# Patient Record
Sex: FEMALE | Race: WHITE | NOT HISPANIC OR LATINO
[De-identification: names, ages, dates, MRNs, and addresses within clinical notes are randomized per-mention and may not be internally consistent; named-entity substitution may affect disease eponyms.]

---

## 2017-11-22 ENCOUNTER — RESULT REVIEW (OUTPATIENT)
Age: 52
End: 2017-11-22

## 2019-02-04 ENCOUNTER — RECORD ABSTRACTING (OUTPATIENT)
Age: 54
End: 2019-02-04

## 2019-02-04 DIAGNOSIS — D25.9 LEIOMYOMA OF UTERUS, UNSPECIFIED: ICD-10-CM

## 2019-02-04 DIAGNOSIS — B97.7 PAPILLOMAVIRUS AS THE CAUSE OF DISEASES CLASSIFIED ELSEWHERE: ICD-10-CM

## 2019-02-04 DIAGNOSIS — Z78.9 OTHER SPECIFIED HEALTH STATUS: ICD-10-CM

## 2019-02-04 DIAGNOSIS — Z80.6 FAMILY HISTORY OF LEUKEMIA: ICD-10-CM

## 2019-02-04 DIAGNOSIS — Z87.891 PERSONAL HISTORY OF NICOTINE DEPENDENCE: ICD-10-CM

## 2019-02-04 LAB — CYTOLOGY CVX/VAG DOC THIN PREP: NORMAL

## 2019-02-11 ENCOUNTER — APPOINTMENT (OUTPATIENT)
Dept: OBGYN | Facility: CLINIC | Age: 54
End: 2019-02-11
Payer: COMMERCIAL

## 2019-02-11 ENCOUNTER — RESULT CHARGE (OUTPATIENT)
Age: 54
End: 2019-02-11

## 2019-02-11 VITALS
HEIGHT: 70 IN | WEIGHT: 188 LBS | DIASTOLIC BLOOD PRESSURE: 78 MMHG | SYSTOLIC BLOOD PRESSURE: 120 MMHG | BODY MASS INDEX: 26.92 KG/M2

## 2019-02-11 LAB
BILIRUB UR QL STRIP: NORMAL
CLARITY UR: CLEAR
COLLECTION METHOD: NORMAL
GLUCOSE UR-MCNC: NORMAL
HCG UR QL: 0.2 EU/DL
HGB UR QL STRIP.AUTO: NORMAL
KETONES UR-MCNC: NORMAL
LEUKOCYTE ESTERASE UR QL STRIP: NORMAL
NITRITE UR QL STRIP: NORMAL
PH UR STRIP: 6
PROT UR STRIP-MCNC: NORMAL
SP GR UR STRIP: 1.03

## 2019-02-11 PROCEDURE — 58301 REMOVE INTRAUTERINE DEVICE: CPT

## 2019-02-11 PROCEDURE — 99396 PREV VISIT EST AGE 40-64: CPT | Mod: 25

## 2019-02-11 NOTE — PHYSICAL EXAM
[Awake] : awake [Alert] : alert [Mass] : no breast mass [Nipple Discharge] : no nipple discharge [Axillary LAD] : no axillary lymphadenopathy [Soft] : soft [Tender] : non tender [Distended] : not distended [Oriented x3] : oriented to person, place, and time [Normal] : uterus [IUD String] : had an IUD string protruding out [Motion Tenderness] : there was no cervical motion tenderness [Tenderness] : nontender [Enlarged ___ wks] : not enlarged [Mass ___ cm] : no uterine mass was palpated [Uterine Adnexae] : were not tender and not enlarged [Adnexa Tenderness] : were not tender [Ovarian Mass (___ Cm)] : there were no adnexal masses [No Tenderness] : no rectal tenderness [Nl Sphincter Tone] : normal sphincter tone [Internal Hemorrhoid] : no internal hemorrhoids [External Hemorrhoid] : no external hemorrhoids

## 2019-02-11 NOTE — HISTORY OF PRESENT ILLNESS
[___ Year(s) Ago] : [unfilled] year(s) ago [Good] : being in good health [Healthy Diet] : a healthy diet [Regular Exercise] : regular exercise [Current] : cancer screening reviewed and current [Last Pap ___] : Last cervical pap smear was [unfilled] [Last Pap Smear ___] : last Papanicolaou cytology done [unfilled] [Last HPV Screen ___] : last human papilloma virus screening done [unfilled] [Last Mammogram ___] : last mammogram done [unfilled] [Last Colonoscopy ___] : last colonoscopy done [unfilled] [Postmenopausal] : is postmenopausal [Tubal Occlusion] : has had a tubal occlusion [Pregnancy History] : pregnancy history: [Total Preg ___] : [unfilled] [Full Term ___] : [unfilled] [Living ___] : [unfilled] [Hot Flashes] : hot flashes [unknown] : the patient is unsure of the date of her LMP [Menarche Age: ____] : age at menarche was [unfilled] [Sexually Active] : is sexually active [Monogamous] : is monogamous [Contraception] : uses contraception [IUD] : has an intrauterine device [Tubal Ligation] : has had a tubal ligation [Male ___] : [unfilled] male [de-identified] : Breast US 12/15/17   BR-1

## 2019-02-12 LAB — HPV HIGH+LOW RISK DNA PNL CVX: NOT DETECTED

## 2019-02-14 LAB — CYTOLOGY CVX/VAG DOC THIN PREP: NORMAL

## 2019-03-01 ENCOUNTER — RESULT REVIEW (OUTPATIENT)
Age: 54
End: 2019-03-01

## 2020-02-25 ENCOUNTER — APPOINTMENT (OUTPATIENT)
Dept: OBGYN | Facility: CLINIC | Age: 55
End: 2020-02-25
Payer: COMMERCIAL

## 2020-02-25 VITALS
HEIGHT: 70 IN | BODY MASS INDEX: 27.49 KG/M2 | WEIGHT: 192 LBS | SYSTOLIC BLOOD PRESSURE: 110 MMHG | DIASTOLIC BLOOD PRESSURE: 70 MMHG

## 2020-02-25 DIAGNOSIS — Z87.19 PERSONAL HISTORY OF OTHER DISEASES OF THE DIGESTIVE SYSTEM: ICD-10-CM

## 2020-02-25 DIAGNOSIS — E66.8 OTHER OBESITY: ICD-10-CM

## 2020-02-25 DIAGNOSIS — Z87.42 PERSONAL HISTORY OF OTHER DISEASES OF THE FEMALE GENITAL TRACT: ICD-10-CM

## 2020-02-25 DIAGNOSIS — Z30.432 ENCOUNTER FOR REMOVAL OF INTRAUTERINE CONTRACEPTIVE DEVICE: ICD-10-CM

## 2020-02-25 PROCEDURE — 99213 OFFICE O/P EST LOW 20 MIN: CPT | Mod: 25

## 2020-02-25 PROCEDURE — 99396 PREV VISIT EST AGE 40-64: CPT

## 2020-02-25 PROCEDURE — 36415 COLL VENOUS BLD VENIPUNCTURE: CPT

## 2020-02-25 RX ORDER — IBUPROFEN 800 MG/1
800 TABLET, FILM COATED ORAL
Refills: 0 | Status: COMPLETED | COMMUNITY
End: 2020-02-25

## 2020-02-25 NOTE — HISTORY OF PRESENT ILLNESS
[Good] : being in good health [1 Year Ago] : 1 year ago [Regular Exercise] : regular exercise [Last Mammogram ___] : Last Mammogram was [unfilled] [Healthy Diet] : a healthy diet [Last Bone Density ___] : Last bone density studies [unfilled] [Last Colonoscopy ___] : Last colonoscopy [unfilled] [Postmenopausal] : is postmenopausal [Last Pap ___] : Last cervical pap smear was [unfilled] [Tubal Occlusion] : has had a tubal occlusion [Pregnancy History] : pregnancy history: [Hot Flashes] : hot flashes [Menarche Age: ____] : age at menarche was [unfilled] [Definite:  ___ (Date)] : the last menstrual period was [unfilled] [Sexually Active] : is sexually active [Tubal Ligation] : has had a tubal ligation [Contraception] : uses contraception [Monogamous] : is monogamous [No] : no [Male ___] : [unfilled] male [Localized Pain] : localized breast pain [Right Breast] : right [Burning] : no burning [Stinging] : no stinging [Mass] : no mass [Itching] : no itching [Discharge] : no discharge [Lesion] : no lesion [Soreness] : no soreness [Diffused Pain] : no diffused pain [Mass (___cm)] : no palpable mass [Nipple Discharge] : no nipple discharge [Skin Color Change] : no skin color change [Night Sweats] : no night sweats

## 2020-02-25 NOTE — PHYSICAL EXAM
[Awake] : awake [Alert] : alert [Examination Of The Breasts] : a normal appearance [No Masses] : no breast masses were palpable [Soft] : soft [Oriented x3] : oriented to person, place, and time [Labia Majora] : labia major [Normal] : clitoris [Labia Minora] : labia minora [Moderate] : there was moderate vaginal bleeding [Pap Obtained] : a Pap smear was performed [Uterine Adnexae] : were not tender and not enlarged [Acute Distress] : no acute distress [Mass] : no breast mass [Tender] : non tender [Nipple Discharge] : no nipple discharge [Axillary LAD] : no axillary lymphadenopathy [Depressed Mood] : not depressed [Distended] : not distended [Flat Affect] : affect not flat [Adnexa Tenderness] : were not tender [Tenderness] : nontender [FreeTextEntry4] : moderate amount of blood internally and externally [FreeTextEntry9] : Guiac deferred secondary to external contamination of blood from vaginal bleeding

## 2020-02-25 NOTE — REVIEW OF SYSTEMS
[Nl] : Musculoskeletal [Sleep Disturbances] : sleep disturbances [Fever] : no fever [Abn Vag Bleeding] : abnormal vaginal bleeding [Pelvic Pain] : no pelvic pain [Chills] : no chills

## 2020-02-25 NOTE — END OF VISIT
[FreeTextEntry3] : I, Savi Calhoun, acted solely as a scribe for Dr. Muñoz on this date 02/25/2020. \par All medical record entries made by the Scribe were at my, Dr. Muñoz's  direction and personally dictated by me on 02/25/2020. I have reviewed the chart and agree that the record accurately reflects my personal performance of the history, physical exam, assessment and plan. I have also personally directed, reviewed, and agreed with the chart.

## 2020-03-03 LAB
CYTOLOGY CVX/VAG DOC THIN PREP: NORMAL
FSH SERPL-MCNC: 25.7 IU/L
HPV HIGH+LOW RISK DNA PNL CVX: NOT DETECTED
LH SERPL-ACNC: 14.7 IU/L
PROLACTIN SERPL-MCNC: 4.6 NG/ML
TSH SERPL-ACNC: 1.98 UIU/ML

## 2020-03-14 LAB
BASOPHILS # BLD AUTO: 0.06 K/UL
BASOPHILS NFR BLD AUTO: 1.1 %
EOSINOPHIL # BLD AUTO: 0.14 K/UL
EOSINOPHIL NFR BLD AUTO: 2.6 %
HCT VFR BLD CALC: 49 %
HGB BLD-MCNC: 15.5 G/DL
IMM GRANULOCYTES NFR BLD AUTO: 0.2 %
LYMPHOCYTES # BLD AUTO: 1.47 K/UL
LYMPHOCYTES NFR BLD AUTO: 27.1 %
MAN DIFF?: NORMAL
MCHC RBC-ENTMCNC: 31.6 GM/DL
MCHC RBC-ENTMCNC: 31.8 PG
MCV RBC AUTO: 100.4 FL
MONOCYTES # BLD AUTO: 0.45 K/UL
MONOCYTES NFR BLD AUTO: 8.3 %
NEUTROPHILS # BLD AUTO: 3.29 K/UL
NEUTROPHILS NFR BLD AUTO: 60.7 %
PLATELET # BLD AUTO: 226 K/UL
RBC # BLD: 4.88 M/UL
RBC # FLD: 12.6 %
WBC # FLD AUTO: 5.42 K/UL

## 2020-03-17 ENCOUNTER — APPOINTMENT (OUTPATIENT)
Dept: OBGYN | Facility: CLINIC | Age: 55
End: 2020-03-17
Payer: COMMERCIAL

## 2020-03-17 ENCOUNTER — ASOB RESULT (OUTPATIENT)
Age: 55
End: 2020-03-17

## 2020-03-17 VITALS
HEIGHT: 70 IN | DIASTOLIC BLOOD PRESSURE: 68 MMHG | WEIGHT: 184 LBS | BODY MASS INDEX: 26.34 KG/M2 | SYSTOLIC BLOOD PRESSURE: 112 MMHG

## 2020-03-17 DIAGNOSIS — Z12.31 ENCOUNTER FOR SCREENING MAMMOGRAM FOR MALIGNANT NEOPLASM OF BREAST: ICD-10-CM

## 2020-03-17 PROCEDURE — 76830 TRANSVAGINAL US NON-OB: CPT

## 2020-03-17 PROCEDURE — 58558Z: CUSTOM

## 2020-03-17 NOTE — HISTORY OF PRESENT ILLNESS
[Last Mammogram ___] : Last Mammogram was [unfilled] [Last Bone Density ___] : Last bone density studies [unfilled] [Last Pap ___] : Last cervical pap smear was [unfilled] [Perimenopausal] : is perimenopausal [Definite:  ___ (Date)] : the last menstrual period was [unfilled] [Menarche Age: ____] : age at menarche was [unfilled] [Sexually Active] : is sexually active [Contraception] : uses contraception [Tubal Ligation] : has had a tubal ligation [Male ___] : [unfilled] male [NA] : N/A [de-identified] : LAMONTE 2/28/2019 - BR 2

## 2020-03-19 ENCOUNTER — TRANSCRIPTION ENCOUNTER (OUTPATIENT)
Age: 55
End: 2020-03-19

## 2020-03-24 ENCOUNTER — TRANSCRIPTION ENCOUNTER (OUTPATIENT)
Age: 55
End: 2020-03-24

## 2020-03-24 LAB — CORE LAB BIOPSY: NORMAL

## 2020-12-23 PROBLEM — Z12.31 ENCOUNTER FOR SCREENING MAMMOGRAM FOR BREAST CANCER: Status: RESOLVED | Noted: 2020-02-25 | Resolved: 2020-12-23

## 2021-02-01 ENCOUNTER — TRANSCRIPTION ENCOUNTER (OUTPATIENT)
Age: 56
End: 2021-02-01

## 2021-02-28 DIAGNOSIS — N60.19 DIFFUSE CYSTIC MASTOPATHY OF UNSPECIFIED BREAST: ICD-10-CM

## 2021-03-01 ENCOUNTER — APPOINTMENT (OUTPATIENT)
Dept: OBGYN | Facility: CLINIC | Age: 56
End: 2021-03-01
Payer: COMMERCIAL

## 2021-03-01 VITALS
DIASTOLIC BLOOD PRESSURE: 74 MMHG | SYSTOLIC BLOOD PRESSURE: 112 MMHG | WEIGHT: 184 LBS | TEMPERATURE: 97.2 F | HEIGHT: 70 IN | BODY MASS INDEX: 26.34 KG/M2

## 2021-03-01 PROCEDURE — 82270 OCCULT BLOOD FECES: CPT

## 2021-03-01 PROCEDURE — 99072 ADDL SUPL MATRL&STAF TM PHE: CPT

## 2021-03-01 PROCEDURE — 99396 PREV VISIT EST AGE 40-64: CPT

## 2021-03-01 PROCEDURE — 81003 URINALYSIS AUTO W/O SCOPE: CPT | Mod: NC,QW

## 2021-03-07 NOTE — END OF VISIT
[FreeTextEntry3] : I, Tonya Del Toro, solely acted as scribe for Dr. Hu Foss on 03/01/2021.\par All medical entries made by the Scribe were at my, Dr. Foss's direction and personally dictated by me on 03/01/2021. I have reviewed the chart and agree that the record accurately reflects my personal performance of the history, physical exam, assessment and plan. I have also personally directed, reviewed, and agreed with the chart.

## 2021-03-07 NOTE — HISTORY OF PRESENT ILLNESS
[Patient reported PAP Smear was normal] : Patient reported PAP Smear was normal [Tubal Occlusion] : has had a tubal occlusion [Y] : Patient is sexually active [Monogamous (Male Partner)] : is monogamous with a male partner [Menarche Age: ____] : age at menarche was [unfilled] [Currently Active] : currently active [Men] : men [Vaginal] : vaginal [Mammogramdate] : 2/28/2019 [TextBox_19] : BR2 [BreastSonogramDate] : 2/28/2019 [TextBox_25] : BR2 [PapSmeardate] : 2/25/2020 [HPVDate] : 2/25/2020 [TextBox_78] : NEG [LMPDate] : 2/10/2020 [PGHxTotal] : 3 [[facility]Term] : 3 [[facility]] : 3 [FreeTextEntry1] : 2/10/2020

## 2021-03-07 NOTE — DISCUSSION/SUMMARY
[FreeTextEntry1] : During this visit comprehensive counseling was given regarding the following concerns: \par \par 1 We discussed the need for proper nutrition and exercise. This includes cardiovascular and pelvic floor exercise. \par \par 2 We discussed the importance of maintaining a proper vaccination schedule and we discussed the utility of the flu vaccine and TDaP. \par \par 3 We discussed the impact of chronic sun exposure and the risk for skin disease as a result. The benefits of a total body scan by a Dermatologist was reviewed.\par \par 4 We discussed the need for certain supplements for most people which include vitamin D3, calcium rich foods with limitation on calcium supplementation by tabular form to 600 mg by mouth daily. As part of a bone health program we recommend weight bearing exercises, and some limited sun exposure (10-15 minutes daily) to help convert vitamin D to its active form. Prescription for baseline DEXA studies were given. \par \par 5 Prescription for mammogram screening and breast sonogram given. \par \par 6 We discussed the availability and utility of genetic screening. Literature on [facility] Genetic Screening was provided. We discussed the nature of this information and the possible need for further evaluation or even prophylactic treatment based on the results. I reviewed the fact that some insurance companies will cover the cost of the testing and some will not. She is aware that the company limits her liability to $199.00 in the event it is not covered and she is aware that the [employer] representative may call to request a credit card payment prior to running the test. She is also aware that she has the option of canceling the test if it is not covered. I reviewed the psychological aspects of genetic risk evaluation and the fact that the presence of a mutation does not necessarily mean she will develop a condition or disease. I also reviewed the fact that there are laws in place which protect her from discrimination based on any result. \par \par During this visit 20 minutes were spent face-to-face with greater than 50% of the time dedicated to counseling.

## 2021-03-21 LAB
CYTOLOGY CVX/VAG DOC THIN PREP: NORMAL
DATE COLLECTED: NORMAL
HEMOCCULT SP1 STL QL: NEGATIVE
HPV HIGH+LOW RISK DNA PNL CVX: NOT DETECTED
QUALITY CONTROL: YES

## 2022-03-31 ENCOUNTER — APPOINTMENT (OUTPATIENT)
Dept: OBGYN | Facility: CLINIC | Age: 57
End: 2022-03-31
Payer: COMMERCIAL

## 2022-03-31 VITALS
SYSTOLIC BLOOD PRESSURE: 122 MMHG | WEIGHT: 186 LBS | DIASTOLIC BLOOD PRESSURE: 60 MMHG | HEIGHT: 70 IN | BODY MASS INDEX: 26.63 KG/M2

## 2022-03-31 LAB
DATE COLLECTED: NORMAL
HEMOCCULT SP1 STL QL: NEGATIVE
QUALITY CONTROL: YES

## 2022-03-31 PROCEDURE — 99396 PREV VISIT EST AGE 40-64: CPT

## 2022-03-31 PROCEDURE — 82270 OCCULT BLOOD FECES: CPT

## 2022-04-02 LAB — HPV HIGH+LOW RISK DNA PNL CVX: NOT DETECTED

## 2022-04-02 NOTE — DISCUSSION/SUMMARY
[FreeTextEntry1] : During this visit comprehensive counseling was given regarding the following concerns:\par \par 1 We discussed the need for a proper nutrition and exercise. This includes cardiovascular and pelvic floor exercise. \par \par 2 We discussed the importance of maintaining a proper vaccination schedule and we discussed the utility of the flu vaccine and TDaP. \par \par 3 We discussed the impact of chronic sun exposure and the risk for skin disease as a result. The benefits of total body scan by a Dermatologist was reviewed. \par \par 4 We discussed the need for certain supplements for most people which include vitamin D3, calcium rich foods with limitation on calcium supplementation by tabular form to 600 mg by mouth daily. As part of a bone health program we recommend weight bearing exercises, and some limited sun exposure (10-15 minutes daily) to help convert vitamin D to its active form.\par \par 5 Pap smear w/HPV done today\par \par 6 Prescription for mammogram screening and breast sonogram given. \par \par 7 We discussed the benefit of pelvic floor exercises. She was instructed on the proper technique for contraction of the pelvic floor muscles and she was encouraged to perform this 20-30 times daily as part of a general exercise program. \par \par She will follow up in 1 year for her annual exam or as needed. \par \par During this visit 20 minutes were spent face-to-face with greater than 50% of the time dedicated to counseling.

## 2022-04-02 NOTE — HISTORY OF PRESENT ILLNESS
[Tubal Occlusion] : has had a tubal occlusion [Menarche Age: ____] : age at menarche was [unfilled] [Y] : Patient is sexually active [Currently Active] : currently active [Mammogramdate] : 3/26/21 [TextBox_19] : BR2 [BreastSonogramDate] : 3/26/21 [TextBox_25] : BR1 [PapSmeardate] : 3/01/21 [TextBox_31] : NEG [ColonoscopyDate] : 2016 [HPVDate] : 3/01/21 [TextBox_78] : NEG [LMPDate] : 2/10/20 [PGHxTotal] : 3 [[facility]Term] : 3 [[facility] Campusiving] : 3 [FreeTextEntry1] : 2/10/20

## 2022-04-02 NOTE — END OF VISIT
[FreeTextEntry3] : I, Dana Mchugh, solely acted as a scribe for Dr. Hu Foss on 03/31/2022 . All medical entries made by the Scribe were at my, Dr. Foss's, direction and personally dictated by me on 03/31/2022. I have reviewed the chart and agree that the record accurately reflects my personal performance of the history, physical exam, assessment and plan. I have also personally directed, reviewed and agreed with the chart.

## 2022-04-02 NOTE — PHYSICAL EXAM
[Chaperone Present] : A chaperone was present in the examining room during all aspects of the physical examination [Appropriately responsive] : appropriately responsive [Alert] : alert [No Acute Distress] : no acute distress [No Lymphadenopathy] : no lymphadenopathy [Soft] : soft [Non-tender] : non-tender [Non-distended] : non-distended [No HSM] : No HSM [No Lesions] : no lesions [No Mass] : no mass [Oriented x3] : oriented x3 [Examination Of The Breasts] : a normal appearance [No Discharge] : no discharge [No Masses] : no breast masses were palpable [Labia Majora] : normal [Labia Minora] : normal [No Bleeding] : There was no active vaginal bleeding [Normal] : normal [Uterine Adnexae] : normal [No Tenderness] : no tenderness [Nl Sphincter Tone] : normal sphincter tone [FreeTextEntry1] : MISHA Salazar [FreeTextEntry9] : Guaiac negative

## 2023-04-03 ENCOUNTER — NON-APPOINTMENT (OUTPATIENT)
Age: 58
End: 2023-04-03

## 2023-04-04 ENCOUNTER — APPOINTMENT (OUTPATIENT)
Dept: OBGYN | Facility: CLINIC | Age: 58
End: 2023-04-04
Payer: COMMERCIAL

## 2023-04-04 VITALS
DIASTOLIC BLOOD PRESSURE: 74 MMHG | SYSTOLIC BLOOD PRESSURE: 108 MMHG | WEIGHT: 182 LBS | BODY MASS INDEX: 26.05 KG/M2 | HEIGHT: 70 IN

## 2023-04-04 DIAGNOSIS — R10.2 PELVIC AND PERINEAL PAIN: ICD-10-CM

## 2023-04-04 DIAGNOSIS — Z01.419 ENCOUNTER FOR GYNECOLOGICAL EXAMINATION (GENERAL) (ROUTINE) W/OUT ABNORMAL FINDINGS: ICD-10-CM

## 2023-04-04 DIAGNOSIS — Z87.42 PERSONAL HISTORY OF OTHER DISEASES OF THE FEMALE GENITAL TRACT: ICD-10-CM

## 2023-04-04 DIAGNOSIS — Z01.411 ENCOUNTER FOR GYNECOLOGICAL EXAMINATION (GENERAL) (ROUTINE) WITH ABNORMAL FINDINGS: ICD-10-CM

## 2023-04-04 LAB
CYTOLOGY CVX/VAG DOC THIN PREP: NORMAL
DATE COLLECTED: NORMAL
HEMOCCULT SP1 STL QL: NEGATIVE
QUALITY CONTROL: YES

## 2023-04-04 PROCEDURE — 82270 OCCULT BLOOD FECES: CPT

## 2023-04-04 PROCEDURE — 99396 PREV VISIT EST AGE 40-64: CPT

## 2023-04-04 NOTE — HISTORY OF PRESENT ILLNESS
[FreeTextEntry1] : NOLA  presents for their annual gynecology exam. She mentions feeling overwhelmed after the recent passing of her son due to overdose. She is doing well physically.  [Mammogramdate] : 4/8/22 [TextBox_19] : BR1 [BreastSonogramDate] : 4/8/22 [BoneDensityDate] : 4/8/22 [ColonoscopyDate] : 2015 [HPVDate] : 3/31/22

## 2023-04-04 NOTE — PHYSICAL EXAM
[Chaperone Present] : A chaperone was present in the examining room during all aspects of the physical examination [Appropriately responsive] : appropriately responsive [Alert] : alert [No Acute Distress] : no acute distress [No Lymphadenopathy] : no lymphadenopathy [Soft] : soft [Non-tender] : non-tender [Non-distended] : non-distended [Oriented x3] : oriented x3 [Examination Of The Breasts] : a normal appearance [No Discharge] : no discharge [No Masses] : no breast masses were palpable [Labia Majora] : normal [Labia Minora] : normal [Uterine Adnexae] : normal [Normal rectal exam] : was normal [Normal Brown Stool] : was normal and brown [Normal] : was normal [None] : there was no rectal mass  [FreeTextEntry1] : Leelee Purvis, [facility] [Breast Reconstruction Right] : breast reconstruction [Breast Reconstruction Left] : breast reconstruction [Occult Blood Positive] : was negative for occult blood analysis [Internal Hemorrhoid] : no internal hemorrhoids were present [External Hemorrhoid] : no external hemorrhoids were present [Skin Tags] : no residual hemorrhoidal skin tags

## 2023-04-04 NOTE — DISCUSSION/SUMMARY
[FreeTextEntry1] : Pap smear collected.\par \par -Referred to [facility] following the passing of her son. \par \par -Prescription for mammogram and breast ultrasound given today. To be sent to [facility].  \par \par -During this visit comprehensive counseling was given regarding the following concerns:\par \par 1. We discussed the need for proper nutrition and exercise. This includes cardiovascular and pelvic floor exercises.\par \par 2. We discussed the importance of maintaining proper vaccination schedule and we discussed the utility of the HPV, Influenza, Shingles, and TDaP vaccines. \par \par 3. We discussed the impact of chronic sun exposure and the risk for skin disease as a result. The benefits of a total body scan by a Dermatologist was reviewed.\par \par 4. We discussed the need for certain supplements for most people, which include Vitamin D3 and calcium rich foods with limitation on calcium supplementation. As part of bone health program, we recommend weight bearing exercises and limited sun exposure (10-15 minutes daily) to help convert Vitamin D to its active form.\par \par All questions and concerns were addressed at today's visit.

## 2023-04-04 NOTE — END OF VISIT
[FreeTextEntry3] : I, Leelee Yaniv solely acted as a scribe for Dr. Hu Foss on 04/04/2023  All medical entries made by the scribe were at my, Dr. Foss's, direction and personally dictated by me on 04/04/2023. I have reviewed the chart and agree that the record accurately reflects my personal performance of the history, physical exam, assessment and plan. I have also personally directed, reviewed, and agreed with the chart.

## 2023-04-17 ENCOUNTER — TRANSCRIPTION ENCOUNTER (OUTPATIENT)
Age: 58
End: 2023-04-17

## 2023-04-28 ENCOUNTER — NON-APPOINTMENT (OUTPATIENT)
Age: 58
End: 2023-04-28

## 2023-05-10 ENCOUNTER — OFFICE (OUTPATIENT)
Dept: URBAN - METROPOLITAN AREA CLINIC 100 | Facility: CLINIC | Age: 58
Setting detail: OPHTHALMOLOGY
End: 2023-05-10
Payer: COMMERCIAL

## 2023-05-10 ENCOUNTER — RX ONLY (RX ONLY)
Age: 58
End: 2023-05-10

## 2023-05-10 DIAGNOSIS — H16.223: ICD-10-CM

## 2023-05-10 DIAGNOSIS — B30.9: ICD-10-CM

## 2023-05-10 PROCEDURE — 99213 OFFICE O/P EST LOW 20 MIN: CPT | Performed by: OPHTHALMOLOGY

## 2023-05-10 ASSESSMENT — SPHEQUIV_DERIVED
OS_SPHEQUIV: 0.75
OS_SPHEQUIV: 0.75
OD_SPHEQUIV: 0.875
OD_SPHEQUIV: 0.875

## 2023-05-10 ASSESSMENT — REFRACTION_CURRENTRX
OS_CYLINDER: -0.50
OD_AXIS: 075
OD_VPRISM_DIRECTION: SV
OS_OVR_VA: 20/
OS_SPHERE: +3.00
OD_OVR_VA: 20/
OS_VPRISM_DIRECTION: SV
OD_CYLINDER: -0.25
OD_SPHERE: +3.00
OS_AXIS: 119

## 2023-05-10 ASSESSMENT — AXIALLENGTH_DERIVED
OD_AL: 23.2559
OD_AL: 23.2559
OS_AL: 23.214
OS_AL: 23.214

## 2023-05-10 ASSESSMENT — CONFRONTATIONAL VISUAL FIELD TEST (CVF)
OS_FINDINGS: FULL
OD_FINDINGS: FULL

## 2023-05-10 ASSESSMENT — REFRACTION_MANIFEST
OD_VA1: 20/20
OD_SPHERE: +1.00
OS_SPHERE: +1.00
OD_AXIS: 055
OS_AXIS: 120
OD_CYLINDER: -0.25
OS_CYLINDER: -0.50
OS_VA1: 20/20

## 2023-05-10 ASSESSMENT — REFRACTION_AUTOREFRACTION
OS_SPHERE: +1.00
OD_SPHERE: +1.00
OS_AXIS: 119
OD_AXIS: 054
OS_CYLINDER: -0.50
OD_CYLINDER: -0.25

## 2023-05-10 ASSESSMENT — KERATOMETRY
OS_K2POWER_DIOPTERS: 44.00
OD_K2POWER_DIOPTERS: 43.75
OD_K1POWER_DIOPTERS: 43.25
OS_AXISANGLE_DEGREES: 070
OD_AXISANGLE_DEGREES: 092
OS_K1POWER_DIOPTERS: 43.50

## 2023-05-10 ASSESSMENT — VISUAL ACUITY
OD_BCVA: 20/30
OS_BCVA: 20/25-

## 2023-06-01 ENCOUNTER — OFFICE (OUTPATIENT)
Dept: URBAN - METROPOLITAN AREA CLINIC 100 | Facility: CLINIC | Age: 58
Setting detail: OPHTHALMOLOGY
End: 2023-06-01
Payer: COMMERCIAL

## 2023-06-01 DIAGNOSIS — H16.223: ICD-10-CM

## 2023-06-01 DIAGNOSIS — H43.393: ICD-10-CM

## 2023-06-01 PROBLEM — B30.9 VIRAL CONJUNCTIVITIS ; BOTH EYES: Status: RESOLVED | Noted: 2023-05-10 | Resolved: 2023-06-01

## 2023-06-01 PROCEDURE — 92014 COMPRE OPH EXAM EST PT 1/>: CPT | Performed by: OPHTHALMOLOGY

## 2023-06-01 ASSESSMENT — REFRACTION_AUTOREFRACTION
OD_SPHERE: +1.00
OD_AXIS: 071
OS_CYLINDER: -0.75
OD_CYLINDER: -0.50
OS_SPHERE: +1.00
OS_AXIS: 114

## 2023-06-01 ASSESSMENT — KERATOMETRY
OS_AXISANGLE_DEGREES: 160
OD_K2POWER_DIOPTERS: 43.75
OD_K1POWER_DIOPTERS: 43.25
OD_AXISANGLE_DEGREES: 180
OS_K2POWER_DIOPTERS: 44.00
OS_K1POWER_DIOPTERS: 43.25

## 2023-06-01 ASSESSMENT — VISUAL ACUITY
OS_BCVA: 20/20
OD_BCVA: 20/20

## 2023-06-01 ASSESSMENT — REFRACTION_MANIFEST
OD_AXIS: 070
OS_SPHERE: +1.00
OD_SPHERE: +1.00
OS_CYLINDER: -0.75
OU_VA: 20/20
OS_AXIS: 115
OD_CYLINDER: -0.50
OS_VA1: 20/20
OD_VA1: 20/20

## 2023-06-01 ASSESSMENT — REFRACTION_CURRENTRX
OS_CYLINDER: -0.50
OS_OVR_VA: 20/
OS_SPHERE: +3.00
OD_OVR_VA: 20/
OD_VPRISM_DIRECTION: SV
OD_CYLINDER: -0.50
OS_VPRISM_DIRECTION: SV
OD_SPHERE: +3.00
OS_AXIS: 115
OD_AXIS: 060

## 2023-06-01 ASSESSMENT — SUPERFICIAL PUNCTATE KERATITIS (SPK)
OS_SPK: T
OD_SPK: T

## 2023-06-01 ASSESSMENT — CONFRONTATIONAL VISUAL FIELD TEST (CVF)
OD_FINDINGS: FULL
OS_FINDINGS: FULL

## 2023-06-01 ASSESSMENT — SPHEQUIV_DERIVED
OD_SPHEQUIV: 0.75
OD_SPHEQUIV: 0.75
OS_SPHEQUIV: 0.625
OS_SPHEQUIV: 0.625

## 2023-06-01 ASSESSMENT — AXIALLENGTH_DERIVED
OS_AL: 23.306
OS_AL: 23.306
OD_AL: 23.3033
OD_AL: 23.3033

## 2023-06-01 ASSESSMENT — TONOMETRY
OS_IOP_MMHG: 18
OD_IOP_MMHG: 20

## 2023-08-03 ENCOUNTER — OFFICE (OUTPATIENT)
Dept: URBAN - METROPOLITAN AREA CLINIC 100 | Facility: CLINIC | Age: 58
Setting detail: OPHTHALMOLOGY
End: 2023-08-03
Payer: COMMERCIAL

## 2023-08-03 DIAGNOSIS — H02.831: ICD-10-CM

## 2023-08-03 DIAGNOSIS — H02.834: ICD-10-CM

## 2023-08-03 DIAGNOSIS — H53.40: ICD-10-CM

## 2023-08-03 DIAGNOSIS — H16.223: ICD-10-CM

## 2023-08-03 PROCEDURE — 92081 LIMITED VISUAL FIELD XM: CPT | Performed by: OPHTHALMOLOGY

## 2023-08-03 PROCEDURE — 92285 EXTERNAL OCULAR PHOTOGRAPHY: CPT | Performed by: OPHTHALMOLOGY

## 2023-08-03 PROCEDURE — 99214 OFFICE O/P EST MOD 30 MIN: CPT | Performed by: OPHTHALMOLOGY

## 2023-08-03 ASSESSMENT — KERATOMETRY
OS_K2POWER_DIOPTERS: 44.00
OS_K1POWER_DIOPTERS: 43.25
OD_K1POWER_DIOPTERS: 43.25
OD_AXISANGLE_DEGREES: 180
OD_K2POWER_DIOPTERS: 43.75
OS_AXISANGLE_DEGREES: 160

## 2023-08-03 ASSESSMENT — SUPERFICIAL PUNCTATE KERATITIS (SPK)
OS_SPK: T
OD_SPK: T

## 2023-08-03 ASSESSMENT — LID EXAM ASSESSMENTS
OS_BLEPHARITIS: 3+
OD_BLEPHARITIS: 3+
OS_BROW_PTOSIS: 4+
OD_BROW_PTOSIS: 4+

## 2023-08-03 ASSESSMENT — REFRACTION_AUTOREFRACTION
OS_AXIS: 114
OS_SPHERE: +1.00
OD_AXIS: 071
OS_CYLINDER: -0.75
OD_CYLINDER: -0.50
OD_SPHERE: +1.00

## 2023-08-03 ASSESSMENT — VISUAL ACUITY
OD_BCVA: 20/25-1
OS_BCVA: 20/25-1

## 2023-08-03 ASSESSMENT — CONFRONTATIONAL VISUAL FIELD TEST (CVF)
OD_FINDINGS: FULL
OS_FINDINGS: FULL

## 2023-08-03 ASSESSMENT — SPHEQUIV_DERIVED
OS_SPHEQUIV: 0.625
OD_SPHEQUIV: 0.75

## 2023-08-03 ASSESSMENT — AXIALLENGTH_DERIVED
OD_AL: 23.3033
OS_AL: 23.306

## 2023-08-03 ASSESSMENT — LID POSITION - DERMATOCHALASIS
OS_DERMATOCHALASIS: LUL
OD_DERMATOCHALASIS: RUL

## 2023-09-07 ENCOUNTER — OFFICE (OUTPATIENT)
Dept: URBAN - METROPOLITAN AREA CLINIC 100 | Facility: CLINIC | Age: 58
Setting detail: OPHTHALMOLOGY
End: 2023-09-07
Payer: COMMERCIAL

## 2023-09-07 DIAGNOSIS — H02.831: ICD-10-CM

## 2023-09-07 DIAGNOSIS — H53.40: ICD-10-CM

## 2023-09-07 DIAGNOSIS — H01.004: ICD-10-CM

## 2023-09-07 DIAGNOSIS — H01.001: ICD-10-CM

## 2023-09-07 DIAGNOSIS — H16.222: ICD-10-CM

## 2023-09-07 DIAGNOSIS — H16.221: ICD-10-CM

## 2023-09-07 DIAGNOSIS — H02.834: ICD-10-CM

## 2023-09-07 PROBLEM — Z41.1 ENCOUNTER FOR COSMETIC SURGERY: Status: ACTIVE | Noted: 2023-08-03

## 2023-09-07 PROCEDURE — 92012 INTRM OPH EXAM EST PATIENT: CPT | Performed by: OPHTHALMOLOGY

## 2023-09-07 PROCEDURE — 83861 MICROFLUID ANALY TEARS: CPT | Performed by: OPHTHALMOLOGY

## 2023-09-07 ASSESSMENT — REFRACTION_AUTOREFRACTION
OS_CYLINDER: -0.75
OS_AXIS: 114
OD_SPHERE: +1.00
OS_SPHERE: +1.00
OD_CYLINDER: -0.50
OD_AXIS: 071

## 2023-09-07 ASSESSMENT — SPHEQUIV_DERIVED
OS_SPHEQUIV: 0.625
OD_SPHEQUIV: 0.75

## 2023-09-07 ASSESSMENT — SUPERFICIAL PUNCTATE KERATITIS (SPK)
OS_SPK: T
OD_SPK: T

## 2023-09-07 ASSESSMENT — VISUAL ACUITY
OD_BCVA: 20/25
OS_BCVA: 20/25-1

## 2023-09-07 ASSESSMENT — AXIALLENGTH_DERIVED
OS_AL: 23.306
OD_AL: 23.3033

## 2023-09-07 ASSESSMENT — KERATOMETRY
OS_K1POWER_DIOPTERS: 43.25
OD_AXISANGLE_DEGREES: 180
OD_K1POWER_DIOPTERS: 43.25
OD_K2POWER_DIOPTERS: 43.75
OS_K2POWER_DIOPTERS: 44.00
OS_AXISANGLE_DEGREES: 160

## 2023-09-07 ASSESSMENT — CONFRONTATIONAL VISUAL FIELD TEST (CVF)
OS_FINDINGS: FULL
OD_FINDINGS: FULL

## 2023-09-07 ASSESSMENT — LID EXAM ASSESSMENTS
OS_BLEPHARITIS: 3+
OD_BROW_PTOSIS: 4+
OS_BROW_PTOSIS: 4+
OD_BLEPHARITIS: 3+

## 2023-09-07 ASSESSMENT — LID POSITION - DERMATOCHALASIS
OS_DERMATOCHALASIS: LUL
OD_DERMATOCHALASIS: RUL

## 2023-09-14 ENCOUNTER — RX ONLY (RX ONLY)
Age: 58
End: 2023-09-14

## 2023-09-14 ENCOUNTER — OFFICE (OUTPATIENT)
Dept: URBAN - METROPOLITAN AREA CLINIC 100 | Facility: CLINIC | Age: 58
Setting detail: OPHTHALMOLOGY
End: 2023-09-14
Payer: COMMERCIAL

## 2023-09-14 DIAGNOSIS — H02.831: ICD-10-CM

## 2023-09-14 DIAGNOSIS — H02.834: ICD-10-CM

## 2023-09-14 PROCEDURE — 15823 BLEPHARP UPR EYELID XCSV SKN: CPT | Performed by: OPHTHALMOLOGY

## 2023-09-14 ASSESSMENT — REFRACTION_AUTOREFRACTION
OS_CYLINDER: -0.75
OD_CYLINDER: -0.50
OD_SPHERE: +1.00
OS_SPHERE: +1.00
OS_AXIS: 114
OD_AXIS: 071

## 2023-09-14 ASSESSMENT — KERATOMETRY
OS_K2POWER_DIOPTERS: 44.00
OS_K1POWER_DIOPTERS: 43.25
OD_AXISANGLE_DEGREES: 180
OD_K2POWER_DIOPTERS: 43.75
OD_K1POWER_DIOPTERS: 43.25
OS_AXISANGLE_DEGREES: 160

## 2023-09-14 ASSESSMENT — VISUAL ACUITY
OD_BCVA: 20/25
OS_BCVA: 20/25-1

## 2023-09-14 ASSESSMENT — LID EXAM ASSESSMENTS
OD_BROW_PTOSIS: 4+
OS_BLEPHARITIS: 3+
OS_BROW_PTOSIS: 4+
OD_BLEPHARITIS: 3+

## 2023-09-14 ASSESSMENT — CONFRONTATIONAL VISUAL FIELD TEST (CVF)
OD_FINDINGS: FULL
OS_FINDINGS: FULL

## 2023-09-14 ASSESSMENT — LID POSITION - DERMATOCHALASIS
OD_DERMATOCHALASIS: RUL
OS_DERMATOCHALASIS: LUL

## 2023-09-14 ASSESSMENT — AXIALLENGTH_DERIVED
OS_AL: 23.306
OD_AL: 23.3033

## 2023-09-14 ASSESSMENT — SPHEQUIV_DERIVED
OS_SPHEQUIV: 0.625
OD_SPHEQUIV: 0.75

## 2023-09-21 ENCOUNTER — OFFICE (OUTPATIENT)
Dept: URBAN - METROPOLITAN AREA CLINIC 100 | Facility: CLINIC | Age: 58
Setting detail: OPHTHALMOLOGY
End: 2023-09-21
Payer: COMMERCIAL

## 2023-09-21 DIAGNOSIS — H02.834: ICD-10-CM

## 2023-09-21 DIAGNOSIS — H02.831: ICD-10-CM

## 2023-09-21 PROBLEM — H16.223 DRY EYE SYNDROME K SICCA; BOTH EYES: Status: ACTIVE | Noted: 2023-09-07

## 2023-09-21 PROBLEM — H01.002 BLEPHARITIS; RIGHT LOWER LID, LEFT LOWER LID: Status: ACTIVE | Noted: 2023-09-21

## 2023-09-21 PROBLEM — H01.005 BLEPHARITIS; RIGHT LOWER LID, LEFT LOWER LID: Status: ACTIVE | Noted: 2023-09-21

## 2023-09-21 PROCEDURE — 99024 POSTOP FOLLOW-UP VISIT: CPT | Performed by: OPHTHALMOLOGY

## 2023-09-21 ASSESSMENT — LID POSITION - COMMENTS
OD_COMMENTS: INCISION INTACT
OS_COMMENTS: INCISION INTACT

## 2023-09-21 ASSESSMENT — KERATOMETRY
OS_AXISANGLE_DEGREES: 160
OD_AXISANGLE_DEGREES: 180
OS_K1POWER_DIOPTERS: 43.25
OD_K1POWER_DIOPTERS: 43.25
OD_K2POWER_DIOPTERS: 43.75
OS_K2POWER_DIOPTERS: 44.00

## 2023-09-21 ASSESSMENT — CONFRONTATIONAL VISUAL FIELD TEST (CVF)
OS_FINDINGS: FULL
OD_FINDINGS: FULL

## 2023-09-21 ASSESSMENT — VISUAL ACUITY
OD_BCVA: 20/25
OS_BCVA: 20/25-1

## 2023-09-21 ASSESSMENT — REFRACTION_AUTOREFRACTION
OS_AXIS: 114
OD_SPHERE: +1.00
OD_CYLINDER: -0.50
OD_AXIS: 071
OS_CYLINDER: -0.75
OS_SPHERE: +1.00

## 2023-09-21 ASSESSMENT — SUPERFICIAL PUNCTATE KERATITIS (SPK)
OD_SPK: T
OS_SPK: T

## 2023-09-21 ASSESSMENT — AXIALLENGTH_DERIVED
OD_AL: 23.3033
OS_AL: 23.306

## 2023-09-21 ASSESSMENT — SPHEQUIV_DERIVED
OS_SPHEQUIV: 0.625
OD_SPHEQUIV: 0.75

## 2023-09-21 ASSESSMENT — LID EXAM ASSESSMENTS
OS_BROW_PTOSIS: 4+
OS_BLEPHARITIS: 3+
OD_BLEPHARITIS: 3+
OD_BROW_PTOSIS: 4+

## 2023-09-21 ASSESSMENT — LID POSITION - DERMATOCHALASIS
OD_DERMATOCHALASIS: ABSENT
OS_DERMATOCHALASIS: ABSENT

## 2023-10-23 ENCOUNTER — OFFICE (OUTPATIENT)
Dept: URBAN - METROPOLITAN AREA CLINIC 100 | Facility: CLINIC | Age: 58
Setting detail: OPHTHALMOLOGY
End: 2023-10-23
Payer: COMMERCIAL

## 2023-10-23 DIAGNOSIS — H02.831: ICD-10-CM

## 2023-10-23 DIAGNOSIS — H02.834: ICD-10-CM

## 2023-10-23 PROBLEM — H01.005 BLEPHARITIS; RIGHT LOWER LID, LEFT LOWER LID: Status: ACTIVE | Noted: 2023-10-23

## 2023-10-23 PROBLEM — H01.002 BLEPHARITIS; RIGHT LOWER LID, LEFT LOWER LID: Status: ACTIVE | Noted: 2023-10-23

## 2023-10-23 PROCEDURE — 99024 POSTOP FOLLOW-UP VISIT: CPT | Performed by: OPHTHALMOLOGY

## 2023-10-23 ASSESSMENT — LID POSITION - COMMENTS
OD_COMMENTS: INCISION INTACT
OS_COMMENTS: INCISION INTACT

## 2023-10-23 ASSESSMENT — REFRACTION_AUTOREFRACTION
OS_SPHERE: +1.00
OS_CYLINDER: -0.75
OS_AXIS: 114
OD_AXIS: 071
OD_CYLINDER: -0.50
OD_SPHERE: +1.00

## 2023-10-23 ASSESSMENT — KERATOMETRY
OS_K1POWER_DIOPTERS: 43.25
OS_K2POWER_DIOPTERS: 44.00
OS_AXISANGLE_DEGREES: 160
OD_K2POWER_DIOPTERS: 43.75
OD_AXISANGLE_DEGREES: 180
OD_K1POWER_DIOPTERS: 43.25

## 2023-10-23 ASSESSMENT — VISUAL ACUITY
OD_BCVA: 20/20-
OS_BCVA: 20/25

## 2023-10-23 ASSESSMENT — LID EXAM ASSESSMENTS
OD_BROW_PTOSIS: 4+
OS_BROW_PTOSIS: 4+
OS_BLEPHARITIS: 3+
OD_BLEPHARITIS: 3+

## 2023-10-23 ASSESSMENT — SUPERFICIAL PUNCTATE KERATITIS (SPK)
OS_SPK: T
OD_SPK: T

## 2023-10-23 ASSESSMENT — AXIALLENGTH_DERIVED
OD_AL: 23.3033
OS_AL: 23.306

## 2023-10-23 ASSESSMENT — SPHEQUIV_DERIVED
OD_SPHEQUIV: 0.75
OS_SPHEQUIV: 0.625

## 2023-10-23 ASSESSMENT — CONFRONTATIONAL VISUAL FIELD TEST (CVF)
OS_FINDINGS: FULL
OD_FINDINGS: FULL

## 2024-02-04 LAB
CYTOLOGY CVX/VAG DOC THIN PREP: NORMAL
HPV HIGH+LOW RISK DNA PNL CVX: DETECTED

## 2024-03-11 ENCOUNTER — APPOINTMENT (OUTPATIENT)
Dept: ORTHOPEDIC SURGERY | Facility: CLINIC | Age: 59
End: 2024-03-11
Payer: COMMERCIAL

## 2024-03-11 DIAGNOSIS — M75.02 ADHESIVE CAPSULITIS OF LEFT SHOULDER: ICD-10-CM

## 2024-03-11 PROCEDURE — 99214 OFFICE O/P EST MOD 30 MIN: CPT | Mod: 25

## 2024-03-11 PROCEDURE — 20610 DRAIN/INJ JOINT/BURSA W/O US: CPT | Mod: LT

## 2024-03-11 PROCEDURE — 73030 X-RAY EXAM OF SHOULDER: CPT | Mod: LT

## 2024-03-11 NOTE — DISCUSSION/SUMMARY
[de-identified] : History, clinical examination and imaging were most consistent with: -left shoulder adhesive capsulitis   The diagnosis was explained in detail. The potential non-surgical and surgical treatments were reviewed. The relative risks and benefits of each option were considered relative to the patients age, activity level, medical history, symptom severity and previously attempted treatments.   The patient was advised to consult with their primary medical provider prior to initiation of any new medications to reduce the risk of adverse effects specific to their long-term home medications and medical history. The risk of gastrointestinal irritation and kidney injury specific to long-term NSAID use was discussed.   -Patient will proceed with formal PT. -Cortisone injection performed today for symptom relief. -Meloxicam as needed for pain. -The added clinical utility of an MRI was discussed. The patient deferred further diagnostic testing at this time. -Follow up in 2 months, if symptoms persist consider repeat CSI vs MRI.   (MDM)   Problem Complexity -Moderate: chronic illness with exacerbation   Risk -Moderate: injection   -Patient has not been seen by another provider in my practice within the past 2 years who specializes in orthopedic surgery.   (Procedure: Corticosteroid Injection)   Injection location was the: -left subacromial bursa   Injection contents were: 40 mg of kenalog and 5 mL of 1% lidocaine   Procedure Details: -Informed consent was obtained. Discussed possible risks of corticosteroid injection including hyperglycemia, infection and skin discoloration. -Discussed that due to infection risk, an interval between injection and any future surgery is 6 weeks for arthroscopy and 3 months for arthroplasty. -Injection performed using aseptic technique. Hemostasis was confirmed. -Procedure was tolerated well with no complications.

## 2024-03-11 NOTE — HISTORY OF PRESENT ILLNESS
[de-identified] : Date of initial evaluation is 03/11/2024 Patient age is 58 year  Occupation is book keeper Body part causing symptoms is the left shoulder  Symptoms began  3 months ago, she pulled her shoulder exercising  Reports progressive pain and stiffness  Location of pain is lateral  Quality of pain is sharp Pain score at rest is 1/10 Pain score during activity is 10/10 Radicular symptoms are not present Prior treatments include none Patient's condition is not associated with workers compensation, no-fault or interscholastic athletics

## 2024-03-26 ENCOUNTER — APPOINTMENT (OUTPATIENT)
Dept: OBGYN | Facility: CLINIC | Age: 59
End: 2024-03-26
Payer: COMMERCIAL

## 2024-03-26 VITALS
WEIGHT: 162 LBS | DIASTOLIC BLOOD PRESSURE: 73 MMHG | HEIGHT: 70 IN | BODY MASS INDEX: 23.19 KG/M2 | SYSTOLIC BLOOD PRESSURE: 110 MMHG

## 2024-03-26 DIAGNOSIS — Z12.39 ENCOUNTER FOR OTHER SCREENING FOR MALIGNANT NEOPLASM OF BREAST: ICD-10-CM

## 2024-03-26 DIAGNOSIS — R92.30 DENSE BREASTS, UNSPECIFIED: ICD-10-CM

## 2024-03-26 DIAGNOSIS — Z78.0 ASYMPTOMATIC MENOPAUSAL STATE: ICD-10-CM

## 2024-03-26 DIAGNOSIS — Z12.12 ENCOUNTER FOR SCREENING FOR MALIGNANT NEOPLASM OF RECTUM: ICD-10-CM

## 2024-03-26 DIAGNOSIS — Z12.4 ENCOUNTER FOR SCREENING FOR MALIGNANT NEOPLASM OF CERVIX: ICD-10-CM

## 2024-03-26 LAB
DATE COLLECTED: NORMAL
HEMOCCULT SP1 STL QL: NEGATIVE
QUALITY CONTROL: YES

## 2024-03-26 PROCEDURE — 82270 OCCULT BLOOD FECES: CPT

## 2024-03-26 PROCEDURE — 99396 PREV VISIT EST AGE 40-64: CPT

## 2024-03-26 RX ORDER — IBUPROFEN 800 MG/1
800 TABLET, FILM COATED ORAL
Qty: 60 | Refills: 0 | Status: DISCONTINUED | COMMUNITY
Start: 2021-03-01 | End: 2024-03-26

## 2024-03-26 RX ORDER — MELOXICAM 15 MG/1
15 TABLET ORAL DAILY
Qty: 30 | Refills: 2 | Status: DISCONTINUED | COMMUNITY
Start: 2024-03-11 | End: 2024-03-26

## 2024-03-26 RX ORDER — TIRZEPATIDE 7.5 MG/.5ML
INJECTION, SOLUTION SUBCUTANEOUS
Refills: 0 | Status: ACTIVE | COMMUNITY

## 2024-03-26 RX ORDER — IBUPROFEN 600 MG/1
600 TABLET, FILM COATED ORAL 3 TIMES DAILY
Qty: 30 | Refills: 0 | Status: DISCONTINUED | COMMUNITY
Start: 2020-03-17 | End: 2024-03-26

## 2024-03-26 NOTE — PHYSICAL EXAM
[Chaperone Present] : A chaperone was present in the examining room during all aspects of the physical examination [Appropriately responsive] : appropriately responsive [Alert] : alert [No Acute Distress] : no acute distress [No Lymphadenopathy] : no lymphadenopathy [Soft] : soft [Non-tender] : non-tender [Non-distended] : non-distended [No HSM] : No HSM [No Lesions] : no lesions [No Mass] : no mass [Oriented x3] : oriented x3 [Examination Of The Breasts] : a normal appearance [No Masses] : no breast masses were palpable [Labia Majora] : normal [Labia Minora] : normal [Normal] : normal [Uterine Adnexae] : normal [Normal rectal exam] : was normal [Normal Brown Stool] : was normal and brown [FreeTextEntry1] : HI Thapa and Cristi Marie [Occult Blood Positive] : was negative for occult blood analysis

## 2024-03-26 NOTE — DISCUSSION/SUMMARY
[FreeTextEntry1] : -Annual gynecology exam- 1) Pap smear collected. 2) Benign rectal exam. Hemoccult negative. The importance of a baseline colonoscopy screening was discussed. She was strongly encouraged to follow up with GI or colorectal surgeon for baseline colonoscopy screening. 3) Prescription for mammogram screening and breast ultrasound ordered.   -During this visit comprehensive counseling was given regarding the following concerns: 1. We discussed the need for proper nutrition and exercise. This includes cardiovascular and pelvic floor exercises. 2. We discussed the importance of maintaining proper vaccination schedule and we discussed the utility of the HPV, Influenza, Shingles, and TDaP vaccines. 3. We discussed the impact of chronic sun exposure and the risk for skin disease as a result. The benefits of a total body scan by a Dermatologist was reviewed. 4. We discussed the need for certain supplements for most people, which include Vitamin D3 (2000 IU) and calcium rich foods with limitation on calcium supplementation by tabular form to 600 MG by mouth daily. As part of bone health program, we recommend weight bearing exercises and limited sun exposure (10-15 minutes daily) to help convert Vitamin D to its active form. 5. We discussed the benefit of pelvic floor exercises. She was instructed on the proper technique for contraction of the pelvic floor muscles and she was encouraged to perform this 20- 30 times daily as part of a general exercise program.  -She will follow up in 1 year or as needed. All questions and concerns were addressed.

## 2024-03-26 NOTE — END OF VISIT
[FreeTextEntry3] : I, Dana Zaldivar solely acted as a scribe for Dr. Hu Foss on 03/26/2024 . All medical entries made by the scribe were at my, Dr. Foss's, direction and personally dictated by me on 03/26/2024 . I have reviewed the chart and agree that the record accurately reflects my personal performance of the history, physical exam, assessment and plan. I have also personally directed, reviewed, and agreed with the chart.

## 2024-03-26 NOTE — HISTORY OF PRESENT ILLNESS
[Tubal Occlusion] : has had a tubal occlusion [Menarche Age: ____] : age at menarche was [unfilled] [Y] : Patient is sexually active [Currently Active] : currently active [Mammogramdate] : 5/18/23 [TextBox_19] : BR2 [PapSmeardate] : 4/4/23 [BoneDensityDate] : 4/8/22 [TextBox_31] : NEG [TextBox_37] : NORMAL [ColonoscopyDate] : 2015 [HPVDate] : 4/4/23 [LMPDate] : 2020 [TextBox_78] : POSITIVE [PGHxTotal] : 3 [[facility]] : 3 [[facility]] : 3 [FreeTextEntry1] : 2020

## 2024-03-28 LAB — HPV HIGH+LOW RISK DNA PNL CVX: NOT DETECTED

## 2024-03-31 LAB — CYTOLOGY CVX/VAG DOC THIN PREP: ABNORMAL

## 2024-05-13 ENCOUNTER — APPOINTMENT (OUTPATIENT)
Dept: ORTHOPEDIC SURGERY | Facility: CLINIC | Age: 59
End: 2024-05-13

## 2024-07-25 ENCOUNTER — OFFICE (OUTPATIENT)
Dept: URBAN - METROPOLITAN AREA CLINIC 12 | Facility: CLINIC | Age: 59
Setting detail: OPHTHALMOLOGY
End: 2024-07-25
Payer: COMMERCIAL

## 2024-07-25 DIAGNOSIS — H43.393: ICD-10-CM

## 2024-07-25 DIAGNOSIS — H25.13: ICD-10-CM

## 2024-07-25 DIAGNOSIS — H43.811: ICD-10-CM

## 2024-07-25 DIAGNOSIS — H16.223: ICD-10-CM

## 2024-07-25 PROCEDURE — 92014 COMPRE OPH EXAM EST PT 1/>: CPT | Performed by: OPHTHALMOLOGY

## 2024-07-25 ASSESSMENT — CONFRONTATIONAL VISUAL FIELD TEST (CVF)
OD_FINDINGS: FULL
OS_FINDINGS: FULL

## 2024-07-25 ASSESSMENT — LID POSITION - COMMENTS
OS_COMMENTS: INCISION INTACT
OD_COMMENTS: INCISION INTACT

## 2024-08-15 ENCOUNTER — OFFICE (OUTPATIENT)
Dept: URBAN - METROPOLITAN AREA CLINIC 12 | Facility: CLINIC | Age: 59
Setting detail: OPHTHALMOLOGY
End: 2024-08-15
Payer: COMMERCIAL

## 2024-08-15 ENCOUNTER — RX ONLY (RX ONLY)
Age: 59
End: 2024-08-15

## 2024-08-15 DIAGNOSIS — H43.393: ICD-10-CM

## 2024-08-15 DIAGNOSIS — H43.811: ICD-10-CM

## 2024-08-15 DIAGNOSIS — H25.13: ICD-10-CM

## 2024-08-15 DIAGNOSIS — H16.223: ICD-10-CM

## 2024-08-15 PROCEDURE — 92012 INTRM OPH EXAM EST PATIENT: CPT | Performed by: OPHTHALMOLOGY

## 2024-08-15 ASSESSMENT — LID POSITION - COMMENTS
OS_COMMENTS: INCISION INTACT
OD_COMMENTS: INCISION INTACT

## 2024-08-15 ASSESSMENT — CONFRONTATIONAL VISUAL FIELD TEST (CVF)
OD_FINDINGS: FULL
OS_FINDINGS: FULL

## 2024-08-26 ENCOUNTER — OFFICE (OUTPATIENT)
Dept: URBAN - METROPOLITAN AREA CLINIC 88 | Facility: CLINIC | Age: 59
Setting detail: OPHTHALMOLOGY
End: 2024-08-26
Payer: COMMERCIAL

## 2024-08-26 DIAGNOSIS — H43.811: ICD-10-CM

## 2024-08-26 DIAGNOSIS — H43.393: ICD-10-CM

## 2024-08-26 PROCEDURE — 92014 COMPRE OPH EXAM EST PT 1/>: CPT | Performed by: OPHTHALMOLOGY

## 2024-08-26 PROCEDURE — 92134 CPTRZ OPH DX IMG PST SGM RTA: CPT | Performed by: OPHTHALMOLOGY

## 2024-08-26 PROCEDURE — 92201 OPSCPY EXTND RTA DRAW UNI/BI: CPT | Performed by: OPHTHALMOLOGY

## 2024-08-26 ASSESSMENT — LID POSITION - COMMENTS
OS_COMMENTS: INCISION INTACT
OD_COMMENTS: INCISION INTACT

## 2024-08-26 ASSESSMENT — CONFRONTATIONAL VISUAL FIELD TEST (CVF)
OS_FINDINGS: FULL
OD_FINDINGS: FULL

## 2025-02-18 ENCOUNTER — OFFICE (OUTPATIENT)
Dept: URBAN - METROPOLITAN AREA CLINIC 103 | Facility: CLINIC | Age: 60
Setting detail: OPHTHALMOLOGY
End: 2025-02-18
Payer: COMMERCIAL

## 2025-02-18 DIAGNOSIS — H52.4: ICD-10-CM

## 2025-02-18 DIAGNOSIS — H01.001: ICD-10-CM

## 2025-02-18 DIAGNOSIS — H00.14: ICD-10-CM

## 2025-02-18 PROBLEM — H25.13 CATARACT SENILE NUCLEAR SCLEROSIS; BOTH EYES: Status: ACTIVE | Noted: 2025-02-18

## 2025-02-18 PROBLEM — H01.004 BLEPHARITIS; RIGHT UPPER LID, LEFT UPPER LID, RIGHT LOWER LID, LEFT LOWER LID: Status: ACTIVE | Noted: 2025-02-18

## 2025-02-18 PROBLEM — H01.005 BLEPHARITIS; RIGHT UPPER LID, LEFT UPPER LID, RIGHT LOWER LID, LEFT LOWER LID: Status: ACTIVE | Noted: 2025-02-18

## 2025-02-18 PROBLEM — H01.002 BLEPHARITIS; RIGHT UPPER LID, LEFT UPPER LID, RIGHT LOWER LID, LEFT LOWER LID: Status: ACTIVE | Noted: 2025-02-18

## 2025-02-18 PROBLEM — B88.0 ACARIASIS, INFESTATION OF MITES AND OR TICS: Status: ACTIVE | Noted: 2025-02-18

## 2025-02-18 PROCEDURE — 92012 INTRM OPH EXAM EST PATIENT: CPT | Performed by: OPTOMETRIST

## 2025-02-18 PROCEDURE — 92015 DETERMINE REFRACTIVE STATE: CPT | Performed by: OPTOMETRIST

## 2025-02-18 ASSESSMENT — REFRACTION_MANIFEST
OS_ADD: +2.25
OD_SPHERE: +1.50
OS_VA1: 20/20
OS_AXIS: 000
OD_CYLINDER: 0.00
OD_AXIS: 000
OD_VA1: 20/20
OS_CYLINDER: 0.00
OD_ADD: +2.25
OS_SPHERE: +1.50

## 2025-02-18 ASSESSMENT — REFRACTION_AUTOREFRACTION
OS_SPHERE: +1.75
OD_AXIS: 061
OD_SPHERE: +1.50
OS_CYLINDER: -0.25
OD_CYLINDER: -0.50
OS_AXIS: 160

## 2025-02-18 ASSESSMENT — LID EXAM ASSESSMENTS
OS_BLEPHARITIS: LLL LUL 4+
OD_BLEPHARITIS: RLL RUL 4+
OS_BROW_PTOSIS: 4+
OD_BROW_PTOSIS: 4+

## 2025-02-18 ASSESSMENT — VISUAL ACUITY
OD_BCVA: 20/50
OS_BCVA: 20/70

## 2025-02-18 ASSESSMENT — CONFRONTATIONAL VISUAL FIELD TEST (CVF)
OD_FINDINGS: FULL
OS_FINDINGS: FULL

## 2025-02-18 ASSESSMENT — SUPERFICIAL PUNCTATE KERATITIS (SPK)
OD_SPK: T
OS_SPK: T

## 2025-02-18 ASSESSMENT — LID POSITION - COMMENTS
OD_COMMENTS: INCISION INTACT
OS_COMMENTS: INCISION INTACT

## 2025-02-18 ASSESSMENT — KERATOMETRY
OD_K2POWER_DIOPTERS: 43.75
OS_K1POWER_DIOPTERS: 42.75
OD_K1POWER_DIOPTERS: 43.25
OS_AXISANGLE_DEGREES: 078
OS_K2POWER_DIOPTERS: 43.50
OD_AXISANGLE_DEGREES: 095

## 2025-02-18 ASSESSMENT — TONOMETRY
OD_IOP_MMHG: 17
OS_IOP_MMHG: 16

## 2025-03-11 ENCOUNTER — OFFICE (OUTPATIENT)
Dept: URBAN - METROPOLITAN AREA CLINIC 103 | Facility: CLINIC | Age: 60
Setting detail: OPHTHALMOLOGY
End: 2025-03-11
Payer: COMMERCIAL

## 2025-03-11 DIAGNOSIS — H01.001: ICD-10-CM

## 2025-03-11 DIAGNOSIS — H01.002: ICD-10-CM

## 2025-03-11 DIAGNOSIS — H01.004: ICD-10-CM

## 2025-03-11 DIAGNOSIS — H01.005: ICD-10-CM

## 2025-03-11 PROCEDURE — 92012 INTRM OPH EXAM EST PATIENT: CPT | Performed by: OPTOMETRIST

## 2025-03-11 ASSESSMENT — REFRACTION_AUTOREFRACTION
OD_AXIS: 055
OD_CYLINDER: -0.50
OS_SPHERE: +1.00
OS_CYLINDER: SPH
OD_SPHERE: +1.25

## 2025-03-11 ASSESSMENT — KERATOMETRY
OD_AXISANGLE_DEGREES: 102
OS_AXISANGLE_DEGREES: 077
OS_K2POWER_DIOPTERS: 43.75
OD_K2POWER_DIOPTERS: 43.75
OD_K1POWER_DIOPTERS: 43.25
OS_K1POWER_DIOPTERS: 43.00

## 2025-03-11 ASSESSMENT — SUPERFICIAL PUNCTATE KERATITIS (SPK)
OD_SPK: T
OS_SPK: T

## 2025-03-11 ASSESSMENT — REFRACTION_MANIFEST
OD_VA1: 20/20
OS_VA1: 20/20
OS_SPHERE: +1.50
OD_SPHERE: +1.50
OS_ADD: +2.25
OS_AXIS: 000
OD_ADD: +2.25
OD_AXIS: 000
OS_CYLINDER: 0.00
OD_CYLINDER: 0.00

## 2025-03-11 ASSESSMENT — LID EXAM ASSESSMENTS
OS_BROW_PTOSIS: 4+
OD_BROW_PTOSIS: 4+
OD_BLEPHARITIS: RLL RUL 4+
OS_BLEPHARITIS: LLL LUL 4+

## 2025-03-11 ASSESSMENT — LID POSITION - COMMENTS
OD_COMMENTS: INCISION INTACT
OS_COMMENTS: INCISION INTACT

## 2025-03-11 ASSESSMENT — TONOMETRY
OD_IOP_MMHG: 17
OS_IOP_MMHG: 19

## 2025-03-11 ASSESSMENT — VISUAL ACUITY
OS_BCVA: 20/60-2
OD_BCVA: 20/60-1

## 2025-03-27 ENCOUNTER — NON-APPOINTMENT (OUTPATIENT)
Age: 60
End: 2025-03-27

## 2025-03-27 ENCOUNTER — APPOINTMENT (OUTPATIENT)
Dept: OBGYN | Facility: CLINIC | Age: 60
End: 2025-03-27
Payer: COMMERCIAL

## 2025-03-27 DIAGNOSIS — N95.2 POSTMENOPAUSAL ATROPHIC VAGINITIS: ICD-10-CM

## 2025-03-27 DIAGNOSIS — N39.0 URINARY TRACT INFECTION, SITE NOT SPECIFIED: ICD-10-CM

## 2025-03-27 LAB
APPEARANCE: CLEAR
BILIRUBIN URINE: NEGATIVE
BLOOD URINE: NEGATIVE
COLOR: YELLOW
GLUCOSE QUALITATIVE U: NEGATIVE
KETONES URINE: NEGATIVE
LEUKOCYTE ESTERASE URINE: ABNORMAL
NITRITE URINE: NEGATIVE
PH URINE: 7.5
PROTEIN URINE: NEGATIVE
SPECIFIC GRAVITY URINE: 1.02
UROBILINOGEN URINE: 0.2 (ref 0.2–?)

## 2025-03-27 PROCEDURE — 99459 PELVIC EXAMINATION: CPT

## 2025-03-27 PROCEDURE — 99396 PREV VISIT EST AGE 40-64: CPT

## 2025-03-27 PROCEDURE — 82270 OCCULT BLOOD FECES: CPT

## 2025-03-27 RX ORDER — ESTRADIOL 0.1 MG/G
0.1 CREAM VAGINAL
Qty: 2 | Refills: 2 | Status: ACTIVE | COMMUNITY
Start: 2025-03-27 | End: 1900-01-01

## 2025-03-30 LAB
DATE COLLECTED: NORMAL
HEMOCCULT SP1 STL QL: NEGATIVE
HPV HIGH+LOW RISK DNA PNL CVX: NOT DETECTED
QUALITY CONTROL: YES

## 2025-04-01 LAB — CYTOLOGY CVX/VAG DOC THIN PREP: ABNORMAL

## 2025-04-22 ENCOUNTER — OFFICE (OUTPATIENT)
Dept: URBAN - METROPOLITAN AREA CLINIC 103 | Facility: CLINIC | Age: 60
Setting detail: OPHTHALMOLOGY
End: 2025-04-22
Payer: COMMERCIAL

## 2025-04-22 DIAGNOSIS — H04.123: ICD-10-CM

## 2025-04-22 DIAGNOSIS — H01.005: ICD-10-CM

## 2025-04-22 DIAGNOSIS — H01.001: ICD-10-CM

## 2025-04-22 DIAGNOSIS — H01.002: ICD-10-CM

## 2025-04-22 DIAGNOSIS — H01.004: ICD-10-CM

## 2025-04-22 PROCEDURE — 92012 INTRM OPH EXAM EST PATIENT: CPT | Performed by: OPTOMETRIST

## 2025-04-22 ASSESSMENT — REFRACTION_MANIFEST
OS_AXIS: 000
OD_CYLINDER: 0.00
OD_VA1: 20/20
OS_ADD: +2.25
OS_SPHERE: +1.50
OS_VA1: 20/20
OD_AXIS: 000
OD_ADD: +2.25
OS_CYLINDER: 0.00
OD_SPHERE: +1.50

## 2025-04-22 ASSESSMENT — KERATOMETRY
OD_AXISANGLE_DEGREES: 093
OS_K1POWER_DIOPTERS: 43.50
OS_K2POWER_DIOPTERS: 44.00
OS_AXISANGLE_DEGREES: 074
OD_K1POWER_DIOPTERS: 43.25
OD_K2POWER_DIOPTERS: 43.75

## 2025-04-22 ASSESSMENT — CONFRONTATIONAL VISUAL FIELD TEST (CVF)
OS_FINDINGS: FULL
OD_FINDINGS: FULL

## 2025-04-22 ASSESSMENT — SUPERFICIAL PUNCTATE KERATITIS (SPK)
OS_SPK: 2+ 3+
OD_SPK: 2+ 3+

## 2025-04-22 ASSESSMENT — REFRACTION_AUTOREFRACTION
OD_CYLINDER: -0.50
OS_SPHERE: +1.00
OD_SPHERE: +1.50
OS_CYLINDER: SPH
OD_AXIS: 065

## 2025-04-22 ASSESSMENT — LID EXAM ASSESSMENTS
OS_BROW_PTOSIS: 4+
OD_BROW_PTOSIS: 4+
OD_BLEPHARITIS: RLL RUL 4+
OS_BLEPHARITIS: LLL LUL 4+

## 2025-04-22 ASSESSMENT — LID POSITION - COMMENTS
OD_COMMENTS: INCISION INTACT
OS_COMMENTS: INCISION INTACT

## 2025-04-22 ASSESSMENT — TONOMETRY
OD_IOP_MMHG: 15
OS_IOP_MMHG: 16

## 2025-04-22 ASSESSMENT — VISUAL ACUITY
OD_BCVA: 20/60-1
OS_BCVA: 20/70

## 2025-04-30 ENCOUNTER — APPOINTMENT (OUTPATIENT)
Dept: OBGYN | Facility: CLINIC | Age: 60
End: 2025-04-30
Payer: COMMERCIAL

## 2025-04-30 VITALS
HEIGHT: 70 IN | DIASTOLIC BLOOD PRESSURE: 83 MMHG | BODY MASS INDEX: 22.19 KG/M2 | WEIGHT: 155 LBS | SYSTOLIC BLOOD PRESSURE: 118 MMHG

## 2025-04-30 DIAGNOSIS — R39.9 UNSPECIFIED SYMPTOMS AND SIGNS INVOLVING THE GENITOURINARY SYSTEM: ICD-10-CM

## 2025-04-30 LAB
APPEARANCE: CLEAR
BILIRUBIN URINE: NEGATIVE
BLOOD URINE: NEGATIVE
COLOR: YELLOW
GLUCOSE QUALITATIVE U: NEGATIVE
KETONES URINE: NEGATIVE
LEUKOCYTE ESTERASE URINE: NEGATIVE
NITRITE URINE: NEGATIVE
PH URINE: 7.5
PROTEIN URINE: NEGATIVE
SPECIFIC GRAVITY URINE: 1.01
UROBILINOGEN URINE: 0.2 (ref 0.2–?)

## 2025-04-30 PROCEDURE — 99213 OFFICE O/P EST LOW 20 MIN: CPT

## 2025-05-02 ENCOUNTER — NON-APPOINTMENT (OUTPATIENT)
Age: 60
End: 2025-05-02

## 2025-05-02 LAB — BACTERIA UR CULT: NORMAL

## 2025-08-12 ENCOUNTER — APPOINTMENT (OUTPATIENT)
Facility: CLINIC | Age: 60
End: 2025-08-12

## 2025-08-28 ENCOUNTER — OFFICE (OUTPATIENT)
Dept: URBAN - METROPOLITAN AREA CLINIC 103 | Facility: CLINIC | Age: 60
Setting detail: OPHTHALMOLOGY
End: 2025-08-28
Payer: COMMERCIAL

## 2025-08-28 DIAGNOSIS — H25.13: ICD-10-CM

## 2025-08-28 DIAGNOSIS — H04.123: ICD-10-CM

## 2025-08-28 DIAGNOSIS — H11.442: ICD-10-CM

## 2025-08-28 PROBLEM — H16.223 DRY EYE SYNDROME K SICCA; BOTH EYES: Status: ACTIVE | Noted: 2025-08-28

## 2025-08-28 PROBLEM — H02.89 MEIBOMIAN GLAND DYSFUNCTION: Status: ACTIVE | Noted: 2025-08-28

## 2025-08-28 PROCEDURE — 68020 INCISE/DRAIN EYELID LINING: CPT | Mod: LT | Performed by: OPHTHALMOLOGY

## 2025-08-28 PROCEDURE — 92012 INTRM OPH EXAM EST PATIENT: CPT | Mod: 25 | Performed by: OPHTHALMOLOGY

## 2025-08-28 ASSESSMENT — VISUAL ACUITY
OS_BCVA: 20/50
OD_BCVA: 20/40

## 2025-08-28 ASSESSMENT — REFRACTION_MANIFEST
OD_CYLINDER: 0.00
OS_CYLINDER: 0.00
OD_AXIS: 000
OS_ADD: +2.25
OD_VA1: 20/20
OS_VA1: 20/20
OS_SPHERE: +1.50
OD_SPHERE: +1.50
OS_AXIS: 000
OD_ADD: +2.25

## 2025-08-28 ASSESSMENT — REFRACTION_AUTOREFRACTION
OS_SPHERE: +1.25
OD_SPHERE: +1.25
OD_AXIS: 023
OS_AXIS: 108
OS_CYLINDER: -0.50
OD_CYLINDER: -0.50

## 2025-08-28 ASSESSMENT — KERATOMETRY
OS_AXISANGLE_DEGREES: 064
OS_K1POWER_DIOPTERS: 43.50
OD_K1POWER_DIOPTERS: 43.25
OS_K2POWER_DIOPTERS: 44.00
OD_AXISANGLE_DEGREES: 096
OD_K2POWER_DIOPTERS: 44.00

## 2025-08-28 ASSESSMENT — TONOMETRY
OD_IOP_MMHG: 16
OS_IOP_MMHG: 15

## 2025-08-28 ASSESSMENT — LID EXAM ASSESSMENTS
OS_BLEPHARITIS: ABSENT
OD_MEIBOMITIS: RLL RUL 1+ 2+
OD_BLEPHARITIS: ABSENT
OD_BROW_PTOSIS: 4+
OS_MEIBOMITIS: LLL LUL 1+ 2+
OS_BROW_PTOSIS: 4+

## 2025-08-28 ASSESSMENT — SUPERFICIAL PUNCTATE KERATITIS (SPK)
OS_SPK: 2+ 3+
OD_SPK: 2+ 3+

## 2025-08-28 ASSESSMENT — CONFRONTATIONAL VISUAL FIELD TEST (CVF)
OD_FINDINGS: FULL
OS_FINDINGS: FULL